# Patient Record
Sex: FEMALE | Race: WHITE | Employment: FULL TIME | URBAN - NONMETROPOLITAN AREA
[De-identification: names, ages, dates, MRNs, and addresses within clinical notes are randomized per-mention and may not be internally consistent; named-entity substitution may affect disease eponyms.]

---

## 2024-06-05 ENCOUNTER — HOSPITAL ENCOUNTER (EMERGENCY)
Age: 19
Discharge: HOME | End: 2024-06-05
Payer: COMMERCIAL

## 2024-06-05 VITALS
OXYGEN SATURATION: 99 % | WEIGHT: 200 LBS | HEIGHT: 70 IN | DIASTOLIC BLOOD PRESSURE: 89 MMHG | TEMPERATURE: 97.1 F | BODY MASS INDEX: 28.63 KG/M2 | HEART RATE: 77 BPM | SYSTOLIC BLOOD PRESSURE: 133 MMHG | RESPIRATION RATE: 16 BRPM

## 2024-06-05 DIAGNOSIS — N30.01 ACUTE CYSTITIS WITH HEMATURIA: Primary | ICD-10-CM

## 2024-06-05 LAB
BILIRUB UR STRIP.AUTO-MCNC: NEGATIVE MG/DL
CHARACTER UR: CLEAR
COLOR: YELLOW
GLUCOSE UR QL STRIP.AUTO: NEGATIVE MG/DL
KETONES UR QL STRIP.AUTO: NEGATIVE
NITRITE UR QL STRIP.AUTO: NEGATIVE
PH UR STRIP.AUTO: 6 [PH] (ref 5–9)
PROT UR STRIP.AUTO-MCNC: NEGATIVE MG/DL
RBC #/AREA URNS HPF: ABNORMAL /[HPF]
SP GR UR STRIP.AUTO: 1.02 (ref 1–1.03)
UROBILINOGEN, URINE: 0.2 EU/DL (ref 0.2–1)
WBC #/AREA URNS HPF: ABNORMAL /[HPF]

## 2024-06-05 PROCEDURE — 99203 OFFICE O/P NEW LOW 30 MIN: CPT

## 2024-06-05 PROCEDURE — 87077 CULTURE AEROBIC IDENTIFY: CPT

## 2024-06-05 PROCEDURE — 81003 URINALYSIS AUTO W/O SCOPE: CPT

## 2024-06-05 PROCEDURE — 87186 SC STD MICRODIL/AGAR DIL: CPT

## 2024-06-05 PROCEDURE — 99214 OFFICE O/P EST MOD 30 MIN: CPT

## 2024-06-05 PROCEDURE — 87086 URINE CULTURE/COLONY COUNT: CPT

## 2024-06-05 RX ORDER — NITROFURANTOIN 25; 75 MG/1; MG/1
100 CAPSULE ORAL 2 TIMES DAILY
Qty: 10 CAPSULE | Refills: 0 | Status: SHIPPED | OUTPATIENT
Start: 2024-06-05 | End: 2024-06-10

## 2024-06-05 RX ORDER — PHENAZOPYRIDINE HYDROCHLORIDE 100 MG/1
100 TABLET, FILM COATED ORAL 3 TIMES DAILY PRN
Qty: 9 TABLET | Refills: 0 | Status: SHIPPED | OUTPATIENT
Start: 2024-06-05 | End: 2024-06-08

## 2024-06-05 ASSESSMENT — PAIN - FUNCTIONAL ASSESSMENT: PAIN_FUNCTIONAL_ASSESSMENT: NONE - DENIES PAIN

## 2024-06-05 NOTE — DISCHARGE INSTRUCTIONS
Medications as prescribed.  Culture results in 24-48 hours.  Increase water intake, frequent hand washing.  Tylenol / Ibuprofen as needed for fever and or pain.  Follow up with PCP in 3-5 days if no improvement or sooner with worsening symptoms.

## 2024-06-05 NOTE — ED PROVIDER NOTES
[facility] URGENT CARE  Urgent Care Encounter       CHIEF COMPLAINT       Chief Complaint   Patient presents with    Dysuria    Urinary Frequency       Nurses Notes reviewed and I agree except as noted in the HPI.  HISTORY OF PRESENT ILLNESS   Shraddha Arrington is a 18 y.o. female who presents with concerns of a urinary tract infection. Reports symptoms of dysuria and urinary frequency started yesterday 6/4. Denies any concern for STI and declines testing today.     HPI    REVIEW OF SYSTEMS     Review of Systems   Constitutional:  Negative for fatigue and fever.   Genitourinary:  Positive for dysuria and frequency. Negative for decreased urine volume, difficulty urinating, dyspareunia, enuresis, flank pain, genital sores, hematuria, menstrual problem, pelvic pain, urgency, vaginal bleeding, vaginal discharge and vaginal pain.   All other systems reviewed and are negative.      PAST MEDICAL HISTORY   History reviewed. No pertinent past medical history.    SURGICALHISTORY     Patient  has no past surgical history on file.    CURRENT MEDICATIONS       Previous Medications    No medications on file       ALLERGIES     Patient is has No Known Allergies.    Patients   There is no immunization history on file for this patient.    FAMILY HISTORY     Patient's family history is not on file.    SOCIAL HISTORY     Patient      PHYSICAL EXAM     ED TRIAGE VITALS  BP: 133/89, Temp: 97.1 °F (36.2 °C), Pulse: 77, Respirations: 16, SpO2: 99 %,Estimated body mass index is 28.7 kg/m² as calculated from the following:    Height as of this encounter: 1.778 m (5' 10\").    Weight as of this encounter: 90.7 kg (200 lb).,Patient's last menstrual period was 05/07/2024.    Physical Exam  Vitals and nursing note reviewed.   Constitutional:       General: She is not in acute distress.     Appearance: Normal appearance. She is normal weight. She is not ill-appearing, toxic-appearing or diaphoretic.   Eyes:      Extraocular Movements:  Macrobid and Pyridium prescribed. Patient was advised to drink plenty of water or fluids and take medication as prescribed.The patient is advised to monitor for any changes in pain, development of high fever, chills, persistent vomiting, development of increasing back or flank pain or increase in hematuria the patient is advised to go to the emergency department for reevaluation and further follow-up if they wouldn't notice any of the above symptoms. The patient was also advised to follow up with family doctor or primary care provider after the antibiotic is completed for repeat urinalysis.  The patient did verbalize understanding of discharge instructions and is agreeable to the treatment plan.  The patient left ambulatory without any changes or concerns in stable condition.        PATIENT REFERRED TO:  No primary care provider on file.  No primary physician on file.      DISCHARGE MEDICATIONS:  New Prescriptions    NITROFURANTOIN, MACROCRYSTAL-MONOHYDRATE, (MACROBID) 100 MG CAPSULE    Take 1 capsule by mouth 2 times daily for 5 days    PHENAZOPYRIDINE (PYRIDIUM) 100 MG TABLET    Take 1 tablet by mouth 3 times daily as needed for Pain       Discontinued Medications    No medications on file       Current Discharge Medication List          FLOR Nails CNP    (Please note that portions of this note were completed with a voice recognition program. Efforts were made to edit the dictations but occasionally words are mis-transcribed.)            Francesca Riley APRN - CNP  06/05/24 [id number]

## 2024-06-06 LAB
BACTERIA UR CULT: ABNORMAL
ORGANISM: ABNORMAL

## 2024-09-26 ENCOUNTER — HOSPITAL ENCOUNTER (EMERGENCY)
Age: 19
Discharge: HOME | End: 2024-09-26
Payer: COMMERCIAL

## 2024-09-26 VITALS
OXYGEN SATURATION: 98 % | WEIGHT: 185 LBS | TEMPERATURE: 98.4 F | BODY MASS INDEX: 26.48 KG/M2 | DIASTOLIC BLOOD PRESSURE: 88 MMHG | HEIGHT: 70 IN | SYSTOLIC BLOOD PRESSURE: 131 MMHG | RESPIRATION RATE: 16 BRPM | HEART RATE: 80 BPM

## 2024-09-26 DIAGNOSIS — Z20.822 EXPOSURE TO COVID-19 VIRUS: Primary | ICD-10-CM

## 2024-09-26 LAB
FLUAV RNA RESP QL NAA+PROBE: NOT DETECTED
FLUBV RNA RESP QL NAA+PROBE: NOT DETECTED
SARS-COV-2 RNA RESP QL NAA+PROBE: NOT DETECTED

## 2024-09-26 PROCEDURE — 99213 OFFICE O/P EST LOW 20 MIN: CPT

## 2024-09-26 PROCEDURE — 99212 OFFICE O/P EST SF 10 MIN: CPT

## 2024-09-26 PROCEDURE — 87636 SARSCOV2 & INF A&B AMP PRB: CPT

## 2024-09-26 RX ORDER — VALACYCLOVIR HYDROCHLORIDE 500 MG/1
500 TABLET, FILM COATED ORAL 2 TIMES DAILY
COMMUNITY

## 2024-09-26 RX ORDER — PHENTERMINE HYDROCHLORIDE 37.5 MG/1
37.5 CAPSULE ORAL EVERY MORNING
COMMUNITY

## 2024-09-26 ASSESSMENT — ENCOUNTER SYMPTOMS
NAUSEA: 0
SHORTNESS OF BREATH: 0
DIARRHEA: 0
RHINORRHEA: 0
EYE REDNESS: 0
TROUBLE SWALLOWING: 0
VOMITING: 0
EYE DISCHARGE: 0
COUGH: 0
SORE THROAT: 0

## 2024-09-26 ASSESSMENT — PAIN - FUNCTIONAL ASSESSMENT: PAIN_FUNCTIONAL_ASSESSMENT: NONE - DENIES PAIN

## 2024-09-26 NOTE — ED PROVIDER NOTES
[facility] URGENT CARE  Urgent Care Encounter      CHIEF COMPLAINT       Chief Complaint   Patient presents with    Concern For COVID-19     Exposed, no s/sx wants covid         Nurses Notes reviewed and I agree except as noted in the HPI.  HISTORY OF PRESENT ILLNESS   Shraddha Arrington is a 19 y.o. female who presents urgent care for concerns of COVID-19.  Reports they have 2 ill roommates with COVID.  Reports that she has a teammate this Saturday and would like a negative finding before she gets all of her teammates sick.  Denies any symptoms.  Reports she did have a cold 2 weeks ago.    REVIEW OF SYSTEMS     Review of Systems   Constitutional:  Negative for chills, diaphoresis, fatigue and fever.   HENT:  Negative for congestion, ear pain, rhinorrhea, sore throat and trouble swallowing.    Eyes:  Negative for discharge and redness.   Respiratory:  Negative for cough and shortness of breath.    Cardiovascular:  Negative for chest pain.   Gastrointestinal:  Negative for diarrhea, nausea and vomiting.   Genitourinary:  Negative for decreased urine volume.   Musculoskeletal:  Negative for neck pain and neck stiffness.   Skin:  Negative for rash.   Neurological:  Negative for headaches.   Hematological:  Negative for adenopathy.   Psychiatric/Behavioral:  Negative for sleep disturbance.        PAST MEDICAL HISTORY   History reviewed. No pertinent past medical history.    SURGICAL HISTORY     Patient  has no past surgical history on file.    CURRENT MEDICATIONS       Previous Medications    NONFORMULARY    BC    PHENTERMINE 37.5 MG CAPSULE    Take 1 capsule by mouth every morning. Max Daily Amount: 37.5 mg    VALACYCLOVIR (VALTREX) 500 MG TABLET    Take 1 tablet by mouth 2 times daily       ALLERGIES     Patient is has No Known Allergies.    FAMILY HISTORY     Patient'sfamily history is not on file.    SOCIAL HISTORY     Patient      PHYSICAL EXAM     ED TRIAGE VITALS  BP: 131/88, Temp: 98.4 °F (36.9 °C),

## 2024-09-26 NOTE — DISCHARGE INSTRUCTIONS
COVID swab was obtained. Please Evaluated for results on Mychart.     If COVID positive.  Use over-the-counter Zyrtec, Flonase, and Mucinex D.  Can use other over-the-counter cough suppressant.  Should have good hand hygiene and cover mouth when coughing.  Use over-the-counter Tylenol and Motrin for pain or fever.  Should follow-up with PCP in 3 to 5 days and worsening symptoms.  Present to Emergency room if develop severe shortness of breath.

## 2024-10-03 ENCOUNTER — HOSPITAL ENCOUNTER (EMERGENCY)
Age: 19
Discharge: HOME | End: 2024-10-03
Payer: COMMERCIAL

## 2024-10-03 VITALS
BODY MASS INDEX: 27.26 KG/M2 | TEMPERATURE: 98 F | RESPIRATION RATE: 20 BRPM | DIASTOLIC BLOOD PRESSURE: 87 MMHG | WEIGHT: 190 LBS | SYSTOLIC BLOOD PRESSURE: 128 MMHG | HEART RATE: 98 BPM | OXYGEN SATURATION: 100 %

## 2024-10-03 DIAGNOSIS — S39.012A STRAIN OF LUMBAR REGION, INITIAL ENCOUNTER: Primary | ICD-10-CM

## 2024-10-03 PROCEDURE — 99213 OFFICE O/P EST LOW 20 MIN: CPT

## 2024-10-03 PROCEDURE — 99213 OFFICE O/P EST LOW 20 MIN: CPT | Performed by: NURSE PRACTITIONER

## 2024-10-03 RX ORDER — METHYLPREDNISOLONE 4 MG
TABLET, DOSE PACK ORAL
Qty: 1 KIT | Refills: 0 | Status: SHIPPED | OUTPATIENT
Start: 2024-10-03 | End: 2024-10-09

## 2024-10-03 RX ORDER — COVID-19 ANTIGEN TEST
2 KIT MISCELLANEOUS 2 TIMES DAILY WITH MEALS
Qty: 30 CAPSULE | Refills: 0 | Status: SHIPPED | OUTPATIENT
Start: 2024-10-03 | End: 2024-10-18

## 2024-10-03 ASSESSMENT — ENCOUNTER SYMPTOMS
SHORTNESS OF BREATH: 0
BACK PAIN: 1

## 2024-10-03 ASSESSMENT — PAIN SCALES - GENERAL: PAINLEVEL_OUTOF10: 8

## 2024-10-03 ASSESSMENT — PAIN DESCRIPTION - ORIENTATION: ORIENTATION: LEFT;MID;LOWER

## 2024-10-03 ASSESSMENT — PAIN DESCRIPTION - DESCRIPTORS: DESCRIPTORS: SHARP;ACHING;CRAMPING

## 2024-10-03 ASSESSMENT — PAIN DESCRIPTION - PAIN TYPE: TYPE: ACUTE PAIN

## 2024-10-03 ASSESSMENT — PAIN - FUNCTIONAL ASSESSMENT: PAIN_FUNCTIONAL_ASSESSMENT: 0-10

## 2024-10-03 ASSESSMENT — PAIN DESCRIPTION - LOCATION: LOCATION: BACK

## 2024-10-03 ASSESSMENT — PAIN DESCRIPTION - FREQUENCY: FREQUENCY: CONTINUOUS

## 2024-10-03 NOTE — ED TRIAGE NOTES
Patient to room with c/o left mid, lower back pain beginning thirty minutes prior to arrival. States while playing tennis, back began to have sharp, shooting pain.

## 2024-10-03 NOTE — ED PROVIDER NOTES
[facility] URGENT CARE  Urgent Care Encounter       CHIEF COMPLAINT       Chief Complaint   Patient presents with    Back Pain     Left mid, lower       Nurses Notes reviewed and I agree except as noted in the HPI.  HISTORY OF PRESENT ILLNESS   Shraddha Arrington is a 19 y.o. female who presents with complaints of left sided back pain that radiates into her lower lumbar.  This is a new problem that started around 2 PM today while playing tennis.  She reports certain range of motion seems to make it worse.  Denies any known certain injury.  No reports of numbness or tingling.  Has tried Aleve which did help some.  She rates her pain currently 8 out of 10 in intensity.    The history is provided by the patient.       REVIEW OF SYSTEMS     Review of Systems   Constitutional:  Negative for activity change.   Respiratory:  Negative for shortness of breath.    Cardiovascular:  Negative for chest pain.   Musculoskeletal:  Positive for back pain (lumbar). Negative for joint swelling and myalgias.   Skin:  Negative for rash and wound.   Neurological:  Negative for weakness and numbness.       PAST MEDICAL HISTORY   History reviewed. No pertinent past medical history.    SURGICALHISTORY     Patient  has no past surgical history on file.    CURRENT MEDICATIONS       Discharge Medication List as of 10/3/2024  3:01 PM        CONTINUE these medications which have NOT CHANGED    Details   valACYclovir (VALTREX) 500 MG tablet Take 1 tablet by mouth 2 times dailyHistorical Med      phentermine 37.5 MG capsule Take 1 capsule by mouth every morning. Max Daily Amount: 37.5 mgHistorical Med      NONFORMULARY BCHistorical Med             ALLERGIES     Patient is has No Known Allergies.    Patients   There is no immunization history on file for this patient.    FAMILY HISTORY     Patient's family history is not on file.    SOCIAL HISTORY     Patient  reports that she has never smoked. She uses smokeless tobacco. She reports  current alcohol use. She reports that she does not use drugs.    PHYSICAL EXAM     ED TRIAGE VITALS  BP: 128/87, Temp: 98 °F (36.7 °C), Pulse: 98, Respirations: 20, SpO2: 100 %,Estimated body mass index is 27.26 kg/m² as calculated from the following:    Height as of 9/26/24: 1.778 m (5' 10\").    Weight as of this encounter: 86.2 kg (190 lb).,Patient's last menstrual period was 09/26/2024.    Physical Exam  Vitals and nursing note reviewed.   Constitutional:       General: She is not in acute distress.  Cardiovascular:      Rate and Rhythm: Normal rate.      Pulses: Normal pulses.   Pulmonary:      Effort: Pulmonary effort is normal.   Musculoskeletal:         General: Signs of injury present.      Thoracic back: Tenderness present.      Lumbar back: Tenderness present. No swelling. Decreased range of motion. Negative right straight leg raise test and negative left straight leg raise test.   Skin:     General: Skin is warm and dry.      Findings: No bruising.   Neurological:      Mental Status: She is alert and oriented to person, place, and time.      Motor: No weakness.      Gait: Gait normal.   Psychiatric:         Behavior: Behavior normal.         DIAGNOSTIC RESULTS     Labs:No results found for this visit on 10/03/24.    IMAGING:  None    EKG:  None    URGENT CARE COURSE:     Vitals:    10/03/24 1440   BP: 128/87   Pulse: 98   Resp: 20   Temp: 98 °F (36.7 °C)   TempSrc: Temporal   SpO2: 100%   Weight: 86.2 kg (190 lb)       Medications - No data to display       PROCEDURES:  None    FINAL IMPRESSION      1. Strain of lumbar region, initial encounter      DISPOSITION/ PLAN   DISPOSITION Decision To Discharge 10/03/2024 02:59:55 PM     Patient here with a strain of the lower lumbar region.  Will start on Medrol Dosepak and Aleve.  Advised to ice 3 times a day for the next 2 days.  May switch to heat thereafter.  Recommend limited sports activity until Monday to provide adequate rest.  Lumbar stretch instructions